# Patient Record
Sex: FEMALE | Race: BLACK OR AFRICAN AMERICAN | NOT HISPANIC OR LATINO | URBAN - METROPOLITAN AREA
[De-identification: names, ages, dates, MRNs, and addresses within clinical notes are randomized per-mention and may not be internally consistent; named-entity substitution may affect disease eponyms.]

---
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
## 2020-01-02 ENCOUNTER — APPOINTMENT (OUTPATIENT)
Dept: PEDIATRIC ADOLESCENT MEDICINE | Facility: CLINIC | Age: 20
End: 2020-01-02

## 2020-01-02 ENCOUNTER — OUTPATIENT (OUTPATIENT)
Dept: OUTPATIENT SERVICES | Facility: HOSPITAL | Age: 20
LOS: 1 days | End: 2020-01-02

## 2020-01-02 VITALS — SYSTOLIC BLOOD PRESSURE: 108 MMHG | DIASTOLIC BLOOD PRESSURE: 56 MMHG | HEART RATE: 79 BPM | WEIGHT: 153 LBS

## 2020-01-02 LAB — HCG UR QL: NEGATIVE

## 2020-01-02 RX ORDER — LEVONORGESTREL 1.5 MG/1
1.5 TABLET ORAL
Qty: 2 | Refills: 0 | Status: COMPLETED | OUTPATIENT
Start: 2019-12-16 | End: 2020-01-02

## 2020-01-02 NOTE — DISCUSSION/SUMMARY
[FreeTextEntry1] : 20 yo F here for pregnancy test given last visit took PlanB and was told to RTC for repeat hcg.  No recent unprotected sex.  Declines condoms today bc she reports having some at home.  Counseled on risks and benefits of various progesterone only methods. Patient reports she will RTC when she decides what is right for her.

## 2020-01-02 NOTE — PHYSICAL EXAM
[No Acute Distress] : no acute distress [Alert] : alert [Normocephalic] : normocephalic [Clear to Ausculatation Bilaterally] : clear to auscultation bilaterally [Regular Rate and Rhythm] : regular rate and rhythm [Soft] : soft

## 2020-01-02 NOTE — HISTORY OF PRESENT ILLNESS
[de-identified] : pregnancy test [FreeTextEntry6] : 18 y/o female presenting for pregnancy test. \par \par Last sex was 2 weeks ago. Did not use a condom.\par Using condoms sometimes. \par Not planning a pregnancy in the next 12 months. Has has convos here in clinic regarding BC methods but continues to remain undecided   - "thinking about different methods".\par \par Per patient trialed both OCP and Depo in the past.  OCP caused migraine HA requiring ER visit, and bled continuously on Depo. Patient reports having discussed mini-pill at previous visits but does not feel she would be adherent with the method.\par \par LMP 12/2019, does not know specific date.\par \par

## 2020-01-03 DIAGNOSIS — Z32.02 ENCOUNTER FOR PREGNANCY TEST, RESULT NEGATIVE: ICD-10-CM

## 2020-01-22 NOTE — ED PROVIDER NOTE - PHYSICAL EXAMINATION
GENERAL: A&Ox3, non-toxic appearing, no acute distress  HEENT: NCAT, neck supple, EOMI but painful, oral mucosa moist, normal conjunctiva, white sclera w/o injection, no discharge, eye soft to palpation w/ worsening sx w/ palpation of left eye, no erythema or swelling of orbit  RESP: CTAB, no respiratory distress, no wheezes/rhonchi/rales, speaking in full sentences  CV: RRR, no murmurs/rubs/gallops  ABDOMEN: soft, non-tender, non-distended, no guarding  MSK: no visible deformities  NEURO: no focal sensory or motor deficits, normal CN exam, PERRL, steady gait, RAMIREZ, LE w/ 20/30, LE w/o 20/40, RE w/ 20/100, RE w/o 20/200, BE w/ 20/30, BE w/o 20/40  SKIN: warm, normal color, well perfused, no rash  PSYCH: normal affect    -Hiren Rivera, PGY-1 Biopsy Method: Personna blade

## 2020-01-28 ENCOUNTER — OUTPATIENT (OUTPATIENT)
Dept: OUTPATIENT SERVICES | Facility: HOSPITAL | Age: 20
LOS: 1 days | End: 2020-01-28

## 2020-01-28 ENCOUNTER — APPOINTMENT (OUTPATIENT)
Dept: PEDIATRIC ADOLESCENT MEDICINE | Facility: CLINIC | Age: 20
End: 2020-01-28

## 2020-01-28 ENCOUNTER — APPOINTMENT (OUTPATIENT)
Dept: GASTROENTEROLOGY | Facility: CLINIC | Age: 20
End: 2020-01-28

## 2020-01-28 VITALS — SYSTOLIC BLOOD PRESSURE: 119 MMHG | DIASTOLIC BLOOD PRESSURE: 74 MMHG | HEART RATE: 94 BPM

## 2020-01-28 DIAGNOSIS — S69.91XA UNSPECIFIED INJURY OF RIGHT WRIST, HAND AND FINGER(S), INITIAL ENCOUNTER: ICD-10-CM

## 2020-01-28 DIAGNOSIS — Z73.3 STRESS, NOT ELSEWHERE CLASSIFIED: ICD-10-CM

## 2020-01-28 RX ORDER — IBUPROFEN 400 MG/1
400 TABLET, FILM COATED ORAL
Qty: 1 | Refills: 1 | Status: COMPLETED | COMMUNITY
Start: 2020-01-28 | End: 2020-01-30

## 2020-01-28 NOTE — RISK ASSESSMENT
[With Teen] : teen [de-identified] : Last sex: 1/6/20, used condom; no currently on contraception
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
## 2020-01-28 NOTE — REVIEW OF SYSTEMS
[Headache] : headache [Myalgia] : myalgia [Restriction of Motion] : restriction of motion [Negative] : Constitutional [Changes in Vision] : no changes in vision [Abdominal Pain] : no abdominal pain

## 2020-01-29 DIAGNOSIS — F43.23 ADJUSTMENT DISORDER WITH MIXED ANXIETY AND DEPRESSED MOOD: ICD-10-CM

## 2020-01-29 DIAGNOSIS — Z63.8 OTHER SPECIFIED PROBLEMS RELATED TO PRIMARY SUPPORT GROUP: ICD-10-CM

## 2020-01-29 DIAGNOSIS — Z63.0 PROBLEMS IN RELATIONSHIP WITH SPOUSE OR PARTNER: ICD-10-CM

## 2020-01-29 SDOH — SOCIAL STABILITY - SOCIAL INSECURITY: PROBLEMS IN RELATIONSHIP WITH SPOUSE OR PARTNER: Z63.0

## 2020-01-29 SDOH — SOCIAL STABILITY - SOCIAL INSECURITY: OTHER SPECIFIED PROBLEMS RELATED TO PRIMARY SUPPORT GROUP: Z63.8

## 2020-01-30 ENCOUNTER — APPOINTMENT (OUTPATIENT)
Dept: PEDIATRIC ADOLESCENT MEDICINE | Facility: CLINIC | Age: 20
End: 2020-01-30

## 2020-01-30 ENCOUNTER — OUTPATIENT (OUTPATIENT)
Dept: OUTPATIENT SERVICES | Facility: HOSPITAL | Age: 20
LOS: 1 days | End: 2020-01-30

## 2020-01-30 DIAGNOSIS — Z63.8 OTHER SPECIFIED PROBLEMS RELATED TO PRIMARY SUPPORT GROUP: ICD-10-CM

## 2020-01-30 DIAGNOSIS — F43.23 ADJUSTMENT DISORDER WITH MIXED ANXIETY AND DEPRESSED MOOD: ICD-10-CM

## 2020-01-30 DIAGNOSIS — Z63.0 PROBLEMS IN RELATIONSHIP WITH SPOUSE OR PARTNER: ICD-10-CM

## 2020-01-30 SDOH — SOCIAL STABILITY - SOCIAL INSECURITY: PROBLEMS IN RELATIONSHIP WITH SPOUSE OR PARTNER: Z63.0

## 2020-01-30 SDOH — SOCIAL STABILITY - SOCIAL INSECURITY: OTHER SPECIFIED PROBLEMS RELATED TO PRIMARY SUPPORT GROUP: Z63.8

## 2020-02-03 ENCOUNTER — APPOINTMENT (OUTPATIENT)
Dept: PEDIATRIC ADOLESCENT MEDICINE | Facility: CLINIC | Age: 20
End: 2020-02-03

## 2020-02-03 ENCOUNTER — OUTPATIENT (OUTPATIENT)
Dept: OUTPATIENT SERVICES | Facility: HOSPITAL | Age: 20
LOS: 1 days | End: 2020-02-03

## 2020-02-03 VITALS — HEART RATE: 85 BPM | DIASTOLIC BLOOD PRESSURE: 60 MMHG | SYSTOLIC BLOOD PRESSURE: 106 MMHG

## 2020-02-03 DIAGNOSIS — R55 SYNCOPE AND COLLAPSE: ICD-10-CM

## 2020-02-03 DIAGNOSIS — R51 HEADACHE: ICD-10-CM

## 2020-02-03 NOTE — REVIEW OF SYSTEMS
[Headache] : headache [Negative] : Constitutional [Changes in Vision] : no changes in vision [Abdominal Pain] : no abdominal pain
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
## 2020-02-03 NOTE — PHYSICAL EXAM
[Tired appearing] : tired appearing [EOMI] : EOMI [NL] : normotonic [Moves All Extremities x 4] : moves all extremities x4 [Normotonic] : normotonic [+2 Patella DTR] : +2 patella DTR [FreeTextEntry2] : + mild TTP of left side of occiput; no bruising; no inflammation [FreeTextEntry5] : YOSEF; Fundoscopic exam: right optic disc - pale  [de-identified] : no nuchal rigidity

## 2020-02-03 NOTE — RISK ASSESSMENT
[Grade: ____] : Grade: [unfilled] [With Teen] : teen [de-identified] : Lives with father & stepmother - conflict at home  [de-identified] : Last sex: 1/6/20, used condom; no currently on contraception

## 2020-02-03 NOTE — DISCUSSION/SUMMARY
[FreeTextEntry1] : 19 year old female presenting with headache x 3 days following syncopal episode. \par \par -Likely vasovagal syncope. Pt has a history of vasovagal syncope. Pt evaluated by cardiology in 2017 - normal echo and EKG. \par -Headache likely secondary to syncope. Reassuring exam with no red flags. \par -Dispensed Acetaminophen 325 mg 2 tabs po x 1.  \par -Counseled re: SMART headache management: sleep 8-9 hours per night, eat regular meals including breakfast, increase hydration, exercise regularly, reduce stress, and avoid triggers.\par -Recommended Acetaminophen or NSAIDs as needed for acute headaches greater than 5/10 in severity.  Do not use more than 2-3 times per week. \par -Reviewed red flags for a headache that warrants immediate evaluation in the emergency department. \par -Encouraged continued engagement in mental health counseling. Moved pt's therapy appt from the end of the week to earlier in the week for additional support. \par -Return to health center for follow-up 2/5/20 or sooner if headache worsens or if pt experiences any red flags.\par \par \par \par

## 2020-02-04 ENCOUNTER — OUTPATIENT (OUTPATIENT)
Dept: OUTPATIENT SERVICES | Facility: HOSPITAL | Age: 20
LOS: 1 days | End: 2020-02-04

## 2020-02-04 ENCOUNTER — APPOINTMENT (OUTPATIENT)
Dept: PEDIATRIC ADOLESCENT MEDICINE | Facility: CLINIC | Age: 20
End: 2020-02-04

## 2020-02-05 ENCOUNTER — APPOINTMENT (OUTPATIENT)
Dept: PEDIATRIC ADOLESCENT MEDICINE | Facility: CLINIC | Age: 20
End: 2020-02-05

## 2020-02-05 ENCOUNTER — OUTPATIENT (OUTPATIENT)
Dept: OUTPATIENT SERVICES | Facility: HOSPITAL | Age: 20
LOS: 1 days | End: 2020-02-05

## 2020-02-05 VITALS — DIASTOLIC BLOOD PRESSURE: 68 MMHG | SYSTOLIC BLOOD PRESSURE: 108 MMHG | HEART RATE: 77 BPM

## 2020-02-05 DIAGNOSIS — Z73.3 STRESS, NOT ELSEWHERE CLASSIFIED: ICD-10-CM

## 2020-02-05 DIAGNOSIS — R51 HEADACHE: ICD-10-CM
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
## 2020-02-05 NOTE — REVIEW OF SYSTEMS
[Negative] : Constitutional [Headache] : headache [Weakness] : weakness [Changes in Vision] : no changes in vision [Dizziness] : no dizziness [Lightheadness] : no lightheadness

## 2020-02-05 NOTE — RISK ASSESSMENT
[Grade: ____] : Grade: [unfilled] [With Teen] : teen [de-identified] : Last sex: 1/6/20, used condom; no currently on contraception  [de-identified] : Lives with father & stepmother - conflict at home

## 2020-02-05 NOTE — PHYSICAL EXAM
[Tired appearing] : tired appearing [EOMI] : EOMI [NL] : normotonic [Moves All Extremities x 4] : moves all extremities x4 [Normotonic] : normotonic [+2 Patella DTR] : +2 patella DTR [de-identified] : no nuchal rigidity  [FreeTextEntry5] : PERRLA; Fundoscopic exam: right optic disc - pale; + photosensitivity; + eyeglasses [de-identified] : Right hand: full ROM with flexion, extension, supination, pronation, and internal & external rotation; strength 5/5; mild TTP at base of 3rd metacarpal; + bruising, inflammation on base of 3rd metacarpal; capillary refill < 2 seconds; pulse 2+

## 2020-02-05 NOTE — DISCUSSION/SUMMARY
[FreeTextEntry1] : 19 year old female presenting 19 year old female presenting with headache x 5 days that began after syncopal episode and in setting of significant stressors. \par \par 1) Headache\par -Possible concussion. Syncopal episode, likely vasovagal, was not witnessed. Unclear if pt hit her head. \par -Possibly exacerbated by stressors at home. \par -SCAT symptom checklist done: total number of symptoms 5/22 and symptom severity score 19/132. \par -Reassuring neurological examination with exception of pale optic disc on right eye. Referred to ophthalmology for further evaluation. \par -Dispensed Ibuprofen 400 mg 1 tab po x1. \par -Counseled re: SMART headache management: sleep 8-9 hours per night, eat regular meals including breakfast, increase hydration, exercise regularly, reduce stress, and avoid triggers.\par -Recommended Acetaminophen or NSAIDs as needed for acute headaches greater than 5/10 in severity.  Do not use more than 2-3 times per week. \par -Reviewed red flags for a headache that warrants immediate evaluation in the emergency department. \par -Return to health [facility] for follow-up 2/720 or sooner if headache worsens or if pt experiences any red flags.\par \par 2) Stressors\par -PHQ 9 done: score of 4; has had thoughts of ending her life in past; no plan; no history of suicide attempt. \par -Encouraged continued engagement in mental health counseling at [facility] with Juani Irby LMSW. \par -Counseled on safety planning. Provided pt with information for Crisis Text Line. \par -Counseled on self-care. Pt enjoys listening to music for relaxation. \par -Pt is aware of services & support at [facility].\par \par \par

## 2020-02-06 ENCOUNTER — APPOINTMENT (OUTPATIENT)
Dept: PEDIATRIC ADOLESCENT MEDICINE | Facility: CLINIC | Age: 20
End: 2020-02-06

## 2020-02-06 DIAGNOSIS — R55 SYNCOPE AND COLLAPSE: ICD-10-CM

## 2020-02-06 DIAGNOSIS — R51 HEADACHE: ICD-10-CM

## 2020-02-12 DIAGNOSIS — Z73.3 STRESS, NOT ELSEWHERE CLASSIFIED: ICD-10-CM

## 2020-02-12 DIAGNOSIS — Z63.0 PROBLEMS IN RELATIONSHIP WITH SPOUSE OR PARTNER: ICD-10-CM

## 2020-02-12 DIAGNOSIS — F43.23 ADJUSTMENT DISORDER WITH MIXED ANXIETY AND DEPRESSED MOOD: ICD-10-CM

## 2020-02-12 DIAGNOSIS — Z62.820 PARENT-BIOLOGICAL CHILD CONFLICT: ICD-10-CM

## 2020-02-12 DIAGNOSIS — R51 HEADACHE: ICD-10-CM

## 2020-02-12 SDOH — SOCIAL STABILITY - SOCIAL INSECURITY: PROBLEMS IN RELATIONSHIP WITH SPOUSE OR PARTNER: Z63.0

## 2020-02-13 ENCOUNTER — APPOINTMENT (OUTPATIENT)
Dept: PEDIATRIC ADOLESCENT MEDICINE | Facility: CLINIC | Age: 20
End: 2020-02-13

## 2020-02-13 ENCOUNTER — OUTPATIENT (OUTPATIENT)
Dept: OUTPATIENT SERVICES | Facility: HOSPITAL | Age: 20
LOS: 1 days | End: 2020-02-13

## 2020-02-24 ENCOUNTER — APPOINTMENT (OUTPATIENT)
Dept: PEDIATRIC ADOLESCENT MEDICINE | Facility: CLINIC | Age: 20
End: 2020-02-24
Payer: COMMERCIAL

## 2020-02-24 ENCOUNTER — OUTPATIENT (OUTPATIENT)
Dept: OUTPATIENT SERVICES | Facility: HOSPITAL | Age: 20
LOS: 1 days | End: 2020-02-24

## 2020-02-24 VITALS — HEART RATE: 98 BPM | OXYGEN SATURATION: 98 % | SYSTOLIC BLOOD PRESSURE: 110 MMHG | DIASTOLIC BLOOD PRESSURE: 57 MMHG

## 2020-02-24 DIAGNOSIS — Z62.820 PARENT-BIOLOGICAL CHILD CONFLICT: ICD-10-CM

## 2020-02-24 DIAGNOSIS — F43.23 ADJUSTMENT DISORDER WITH MIXED ANXIETY AND DEPRESSED MOOD: ICD-10-CM

## 2020-02-24 DIAGNOSIS — Z63.0 PROBLEMS IN RELATIONSHIP WITH SPOUSE OR PARTNER: ICD-10-CM

## 2020-02-24 DIAGNOSIS — N94.4 PRIMARY DYSMENORRHEA: ICD-10-CM

## 2020-02-24 PROCEDURE — 99212 OFFICE O/P EST SF 10 MIN: CPT | Mod: NC

## 2020-02-24 RX ORDER — IBUPROFEN 600 MG/1
600 TABLET, FILM COATED ORAL
Qty: 30 | Refills: 0 | Status: COMPLETED | COMMUNITY
Start: 2020-01-28 | End: 2020-02-24

## 2020-02-24 RX ORDER — ACETAMINOPHEN 325 MG/1
325 TABLET ORAL
Qty: 2 | Refills: 0 | Status: COMPLETED | COMMUNITY
Start: 2020-02-03 | End: 2020-02-24

## 2020-02-24 SDOH — SOCIAL STABILITY - SOCIAL INSECURITY: PROBLEMS IN RELATIONSHIP WITH SPOUSE OR PARTNER: Z63.0

## 2020-02-24 NOTE — HISTORY OF PRESENT ILLNESS
[de-identified] : dysmenorrhea [FreeTextEntry6] : 18 y/o female with dysmenorrhea.  Period began on 2/20/20.  Has not taken any pain medications today yet.  Pain is currently an 8/10 in severity.  No nausea/vomiting.  +Mild headache.  No back pain.  No diarrhea.  \par \par Last SA was on 2/8/20.  Did not use a condom; using withdrawal method.  Did not take a Plan B.  Not currently on BC.  Using condoms never.   No new partner since last STI screening in October 2019.

## 2020-02-24 NOTE — DISCUSSION/SUMMARY
[FreeTextEntry1] : 20 y/o female with dysmenorrhea.  Has not taken any pain medications today yet.\par \par Plan\par - Ibuprofen 400 mg po x 1 given for pain.  \par - RTC PRN persistent or worsening symptoms.  Declined BC today.

## 2020-02-26 DIAGNOSIS — N94.4 PRIMARY DYSMENORRHEA: ICD-10-CM

## 2020-02-27 ENCOUNTER — OUTPATIENT (OUTPATIENT)
Dept: OUTPATIENT SERVICES | Facility: HOSPITAL | Age: 20
LOS: 1 days | End: 2020-02-27

## 2020-02-27 ENCOUNTER — APPOINTMENT (OUTPATIENT)
Dept: PEDIATRIC ADOLESCENT MEDICINE | Facility: CLINIC | Age: 20
End: 2020-02-27

## 2020-03-03 DIAGNOSIS — Z63.0 PROBLEMS IN RELATIONSHIP WITH SPOUSE OR PARTNER: ICD-10-CM

## 2020-03-03 DIAGNOSIS — Z62.820 PARENT-BIOLOGICAL CHILD CONFLICT: ICD-10-CM

## 2020-03-03 DIAGNOSIS — F43.23 ADJUSTMENT DISORDER WITH MIXED ANXIETY AND DEPRESSED MOOD: ICD-10-CM

## 2020-03-03 SDOH — SOCIAL STABILITY - SOCIAL INSECURITY: PROBLEMS IN RELATIONSHIP WITH SPOUSE OR PARTNER: Z63.0

## 2020-03-05 ENCOUNTER — APPOINTMENT (OUTPATIENT)
Dept: PEDIATRIC ADOLESCENT MEDICINE | Facility: CLINIC | Age: 20
End: 2020-03-05

## 2020-03-05 ENCOUNTER — OUTPATIENT (OUTPATIENT)
Dept: OUTPATIENT SERVICES | Facility: HOSPITAL | Age: 20
LOS: 1 days | End: 2020-03-05

## 2020-03-06 ENCOUNTER — APPOINTMENT (OUTPATIENT)
Dept: PEDIATRIC ADOLESCENT MEDICINE | Facility: CLINIC | Age: 20
End: 2020-03-06

## 2020-03-06 DIAGNOSIS — Z62.820 PARENT-BIOLOGICAL CHILD CONFLICT: ICD-10-CM

## 2020-03-06 DIAGNOSIS — F43.23 ADJUSTMENT DISORDER WITH MIXED ANXIETY AND DEPRESSED MOOD: ICD-10-CM

## 2020-03-06 DIAGNOSIS — Z63.0 PROBLEMS IN RELATIONSHIP WITH SPOUSE OR PARTNER: ICD-10-CM

## 2020-03-06 SDOH — SOCIAL STABILITY - SOCIAL INSECURITY: PROBLEMS IN RELATIONSHIP WITH SPOUSE OR PARTNER: Z63.0

## 2020-03-10 ENCOUNTER — APPOINTMENT (OUTPATIENT)
Dept: PEDIATRIC ADOLESCENT MEDICINE | Facility: CLINIC | Age: 20
End: 2020-03-10
Payer: SELF-PAY

## 2020-03-10 ENCOUNTER — OUTPATIENT (OUTPATIENT)
Dept: OUTPATIENT SERVICES | Facility: HOSPITAL | Age: 20
LOS: 1 days | End: 2020-03-10

## 2020-03-10 VITALS
SYSTOLIC BLOOD PRESSURE: 105 MMHG | HEART RATE: 81 BPM | TEMPERATURE: 98 F | DIASTOLIC BLOOD PRESSURE: 51 MMHG | OXYGEN SATURATION: 98 %

## 2020-03-10 DIAGNOSIS — Z78.9 OTHER SPECIFIED HEALTH STATUS: ICD-10-CM

## 2020-03-10 DIAGNOSIS — J06.9 ACUTE UPPER RESPIRATORY INFECTION, UNSPECIFIED: ICD-10-CM

## 2020-03-10 DIAGNOSIS — Z87.09 PERSONAL HISTORY OF OTHER DISEASES OF THE RESPIRATORY SYSTEM: ICD-10-CM

## 2020-03-10 PROCEDURE — 99213 OFFICE O/P EST LOW 20 MIN: CPT | Mod: NC

## 2020-03-10 RX ORDER — IBUPROFEN 400 MG/1
400 TABLET, FILM COATED ORAL
Qty: 1 | Refills: 0 | Status: COMPLETED | COMMUNITY
Start: 2020-02-24 | End: 2020-03-10

## 2020-03-10 RX ORDER — BENZOCAINE AND MENTHOL 15; 3.6 MG/1; MG/1
15-3.6 LOZENGE ORAL
Qty: 8 | Refills: 0 | Status: ACTIVE | COMMUNITY
Start: 2020-03-10

## 2020-03-10 RX ORDER — IBUPROFEN 400 MG/1
400 TABLET, FILM COATED ORAL
Qty: 1 | Refills: 0 | Status: COMPLETED | COMMUNITY
Start: 2020-03-10 | End: 2020-03-11

## 2020-03-10 NOTE — DISCUSSION/SUMMARY
[FreeTextEntry1] : 19 year yo F here for viral pharyngitis. Patient with no fevers, no concerning oropharynx findings make this likely a viral in origin (vs strep). \par \par - Dispensed ibuprofen and cepacol today\par - Counseled re: supportive care and pain management. \par - Encouraged rest.  Increase fluids\par - Counseled re: fever management.  \par - Viral Rx handout given.\par - Return to clinic as needed for new or worsening symptoms. \par \par

## 2020-03-10 NOTE — HISTORY OF PRESENT ILLNESS
[de-identified] : sore throat [FreeTextEntry6] : 19 year yo F here for sore/scratchy throat x 1 day. \par \par Patient reports 1 day of symptoms of sore throat, congestion, runny nose.\par no cough, no chest pain, no difficulty breathing.\par \par No fevers.\par Tolerating PO at baseline.\par No change in urination or bowel movements. Hx of constipation (BMs at baseline)\par No nausea or vomiting. \par No headache.\par No abdominal pain.\par \par Has not taken any medications today. \par Desires to stay in school.\par \par Sexually active: no oral sex. Last sex >7 days ago. Did not use a condom; using withdrawal method. Did not take a Plan B. Not currently on BC. Using condoms never. No new partner since last STI screening.\par

## 2020-03-10 NOTE — PHYSICAL EXAM
[No Acute Distress] : no acute distress [Alert] : alert [Normocephalic] : normocephalic [NL] : warm [de-identified] : MILD ERYTHEMA OF OROPHARYNX

## 2020-03-12 DIAGNOSIS — J06.9 ACUTE UPPER RESPIRATORY INFECTION, UNSPECIFIED: ICD-10-CM

## 2020-03-12 DIAGNOSIS — J02.9 ACUTE PHARYNGITIS, UNSPECIFIED: ICD-10-CM

## 2020-03-13 ENCOUNTER — APPOINTMENT (OUTPATIENT)
Dept: PEDIATRIC ADOLESCENT MEDICINE | Facility: CLINIC | Age: 20
End: 2020-03-13

## 2020-03-13 ENCOUNTER — OUTPATIENT (OUTPATIENT)
Dept: OUTPATIENT SERVICES | Facility: HOSPITAL | Age: 20
LOS: 1 days | End: 2020-03-13

## 2020-03-13 ENCOUNTER — RESULT CHARGE (OUTPATIENT)
Age: 20
End: 2020-03-13

## 2020-03-13 VITALS — DIASTOLIC BLOOD PRESSURE: 64 MMHG | SYSTOLIC BLOOD PRESSURE: 103 MMHG | WEIGHT: 150 LBS | HEART RATE: 91 BPM

## 2020-03-13 DIAGNOSIS — Z32.02 ENCOUNTER FOR PREGNANCY TEST, RESULT NEGATIVE: ICD-10-CM

## 2020-03-13 DIAGNOSIS — Z30.012 ENCOUNTER FOR PRESCRIPTION OF EMERGENCY CONTRACEPTION: ICD-10-CM

## 2020-03-13 LAB — HCG UR QL: NEGATIVE

## 2020-03-13 RX ORDER — LEVONORGESTREL 1.5 MG/1
1.5 TABLET ORAL
Qty: 2 | Refills: 0 | Status: ACTIVE | OUTPATIENT
Start: 2020-03-13

## 2020-03-13 NOTE — HISTORY OF PRESENT ILLNESS
[FreeTextEntry6] : 19 yr old female presents for Plan B\par Had unprotected sex 2 days ago\par LMP 2/20/20\par STI Screen 10/19\par No birth control due to severe headaches\par

## 2020-03-13 NOTE — DISCUSSION/SUMMARY
[FreeTextEntry1] : encounter for emergency contraception\par Plan B given\par UCG Negative\par condoms given\par offered birth control method declined

## 2020-03-19 ENCOUNTER — APPOINTMENT (OUTPATIENT)
Dept: PEDIATRIC ADOLESCENT MEDICINE | Facility: CLINIC | Age: 20
End: 2020-03-19

## 2020-03-30 DIAGNOSIS — Z32.02 ENCOUNTER FOR PREGNANCY TEST, RESULT NEGATIVE: ICD-10-CM

## 2020-03-30 DIAGNOSIS — Z30.012 ENCOUNTER FOR PRESCRIPTION OF EMERGENCY CONTRACEPTION: ICD-10-CM

## 2020-04-01 ENCOUNTER — OUTPATIENT (OUTPATIENT)
Dept: OUTPATIENT SERVICES | Facility: HOSPITAL | Age: 20
LOS: 1 days | End: 2020-04-01

## 2020-04-01 ENCOUNTER — APPOINTMENT (OUTPATIENT)
Dept: PEDIATRIC ADOLESCENT MEDICINE | Facility: CLINIC | Age: 20
End: 2020-04-01

## 2020-05-01 DIAGNOSIS — Z63.8 OTHER SPECIFIED PROBLEMS RELATED TO PRIMARY SUPPORT GROUP: ICD-10-CM

## 2020-05-01 DIAGNOSIS — F43.23 ADJUSTMENT DISORDER WITH MIXED ANXIETY AND DEPRESSED MOOD: ICD-10-CM

## 2020-05-01 SDOH — SOCIAL STABILITY - SOCIAL INSECURITY: OTHER SPECIFIED PROBLEMS RELATED TO PRIMARY SUPPORT GROUP: Z63.8

## 2020-12-23 PROBLEM — J06.9 UPPER RESPIRATORY INFECTION, ACUTE: Status: RESOLVED | Noted: 2018-12-14 | Resolved: 2020-12-23

## 2020-12-23 PROBLEM — Z87.09 HISTORY OF SORE THROAT: Status: RESOLVED | Noted: 2020-03-10 | Resolved: 2020-12-23

## 2022-09-17 ENCOUNTER — EMERGENCY (EMERGENCY)
Facility: HOSPITAL | Age: 22
LOS: 1 days | Discharge: HOME | End: 2022-09-17
Attending: STUDENT IN AN ORGANIZED HEALTH CARE EDUCATION/TRAINING PROGRAM

## 2022-09-17 VITALS
WEIGHT: 139.99 LBS | SYSTOLIC BLOOD PRESSURE: 102 MMHG | DIASTOLIC BLOOD PRESSURE: 53 MMHG | HEART RATE: 65 BPM | OXYGEN SATURATION: 100 % | TEMPERATURE: 99 F | HEIGHT: 67 IN | RESPIRATION RATE: 16 BRPM

## 2022-09-17 DIAGNOSIS — M25.572 PAIN IN LEFT ANKLE AND JOINTS OF LEFT FOOT: ICD-10-CM
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
## 2022-09-17 DIAGNOSIS — M79.672 PAIN IN LEFT FOOT: ICD-10-CM

## 2022-09-17 PROCEDURE — 73630 X-RAY EXAM OF FOOT: CPT | Mod: 26,LT

## 2022-09-17 PROCEDURE — 99283 EMERGENCY DEPT VISIT LOW MDM: CPT

## 2022-09-17 PROCEDURE — 73610 X-RAY EXAM OF ANKLE: CPT | Mod: 26,LT

## 2022-09-17 RX ORDER — ACETAMINOPHEN 500 MG
650 TABLET ORAL ONCE
Refills: 0 | Status: COMPLETED | OUTPATIENT
Start: 2022-09-17 | End: 2022-09-17

## 2022-09-17 RX ORDER — IBUPROFEN 200 MG
400 TABLET ORAL ONCE
Refills: 0 | Status: COMPLETED | OUTPATIENT
Start: 2022-09-17 | End: 2022-09-17

## 2022-09-17 RX ADMIN — Medication 650 MILLIGRAM(S): at 11:09

## 2022-09-17 RX ADMIN — Medication 400 MILLIGRAM(S): at 11:31

## 2022-09-17 NOTE — ED PROVIDER NOTE - PROGRESS NOTE DETAILS
No obvious fx on XR. Discussed results with the patient and recommended RICE therapy - rest, ice, compression and elevation. Placed air cast, given crutches, and recommended follow up with orthopedics, tylenol/motrin for pain control. I discussed the results and answered all questions. Patient is amenable to plan of being discharged home. Strict ED return precautions given

## 2022-09-17 NOTE — ED PROVIDER NOTE - NSFOLLOWUPINSTRUCTIONS_ED_ALL_ED_FT
- follow up with orthopedics in 1-2 weeks - will give information to clinic  - take tylenol and motrin together as needed for the pain

## 2022-09-17 NOTE — ED PROVIDER NOTE - NSFOLLOWUPCLINICS_GEN_ALL_ED_FT
[facility] [place]  Orthopedics  .  [place]   Phone: [phone]  Fax:

## 2022-09-17 NOTE — ED PROVIDER NOTE - OBJECTIVE STATEMENT
22 year old female presents for left foot and ankle pain s/p fall this morning. She reports walking down the stairs and tripping and then likely rolled her ankle. Did not hit her head or lose consciousness. She denies any chest, shoulder, back, head or knee pain.

## 2022-09-17 NOTE — ED ADULT NURSE NOTE - OBJECTIVE STATEMENT
PT PRESENTED TO ER, AOX3 AND AMBULATORY, WITH COMPLAINTS OF LEFT ANKLE PAIN. AS PER PT, SHE MISSED 1 STEP AND TWISTED ANKLE. PT HAD C/O PAIN WHEN BEARING WEIGHT ON EXTREMITY.

## 2022-09-17 NOTE — ED PROVIDER NOTE - PHYSICAL EXAMINATION
Const: Cooperative, in NAD  HEENT: Head is normocephalic, atraumatic. PERRLA. EOMI. Sclera and conjunctiva normal  Back: No midlines or paraspinous tenderness.  MSK: No pitting edema, erythema, or cyanosis.           Left ankle: wrapped in gauze. NV intact. Tenderness over lower lateral malleolus and calcaneus . +2 dp pulses bilaterally and can wiggle toes. Unable to bear weight  Neuro: Awake, AOx3, follows commands

## 2022-09-17 NOTE — ED PROVIDER NOTE - PATIENT PORTAL LINK FT
You can access the FollowMyHealth Patient Portal offered by [facility] by registering at the following website: [email or web address]. By joining [employer]’s FollowMyHealth portal, you will also be able to view your health information using other applications (apps) compatible with our system.

## 2022-09-17 NOTE — ED PROVIDER NOTE - CLINICAL SUMMARY MEDICAL DECISION MAKING FREE TEXT BOX
Patient presents for left ankle and foot pain s/p fall. VSS. She has tenderness to palpation to the left lateral malleolus, left lateral part of foot and unable to bear weight. Concern for fx, dislocation, ligamentous tear, contusion. Plan: meds, XR

## 2024-05-03 ENCOUNTER — EMERGENCY (EMERGENCY)
Facility: HOSPITAL | Age: 24
LOS: 0 days | Discharge: HOME | End: 2024-05-03
Attending: STUDENT IN AN ORGANIZED HEALTH CARE EDUCATION/TRAINING PROGRAM
Payer: COMMERCIAL

## 2024-05-03 VITALS
WEIGHT: 145.06 LBS | SYSTOLIC BLOOD PRESSURE: 105 MMHG | DIASTOLIC BLOOD PRESSURE: 71 MMHG | RESPIRATION RATE: 18 BRPM | OXYGEN SATURATION: 100 % | HEIGHT: 68 IN | TEMPERATURE: 98 F | HEART RATE: 87 BPM

## 2024-05-03 DIAGNOSIS — N93.9 ABNORMAL UTERINE AND VAGINAL BLEEDING, UNSPECIFIED: ICD-10-CM

## 2024-05-03 DIAGNOSIS — N92.1 EXCESSIVE AND FREQUENT MENSTRUATION WITH IRREGULAR CYCLE: ICD-10-CM

## 2024-05-03 DIAGNOSIS — Z86.2 PERSONAL HISTORY OF DISEASES OF THE BLOOD AND BLOOD-FORMING ORGANS AND CERTAIN DISORDERS INVOLVING THE IMMUNE MECHANISM: ICD-10-CM

## 2024-05-03 LAB
ALBUMIN SERPL ELPH-MCNC: 3.8 G/DL — SIGNIFICANT CHANGE UP (ref 3.3–5)
ALP SERPL-CCNC: 49 U/L — SIGNIFICANT CHANGE UP (ref 40–120)
ALT FLD-CCNC: 17 U/L — SIGNIFICANT CHANGE UP (ref 12–78)
ANION GAP SERPL CALC-SCNC: 5 MMOL/L — SIGNIFICANT CHANGE UP (ref 5–17)
APTT BLD: 27.1 SEC — SIGNIFICANT CHANGE UP (ref 24.5–35.6)
AST SERPL-CCNC: 20 U/L — SIGNIFICANT CHANGE UP (ref 15–37)
BASOPHILS # BLD AUTO: 0.04 K/UL — SIGNIFICANT CHANGE UP (ref 0–0.2)
BASOPHILS NFR BLD AUTO: 0.6 % — SIGNIFICANT CHANGE UP (ref 0–2)
BILIRUB SERPL-MCNC: 0.2 MG/DL — SIGNIFICANT CHANGE UP (ref 0.2–1.2)
BLD GP AB SCN SERPL QL: SIGNIFICANT CHANGE UP
BUN SERPL-MCNC: 9 MG/DL — SIGNIFICANT CHANGE UP (ref 7–23)
CALCIUM SERPL-MCNC: 9.4 MG/DL — SIGNIFICANT CHANGE UP (ref 8.5–10.1)
CHLORIDE SERPL-SCNC: 106 MMOL/L — SIGNIFICANT CHANGE UP (ref 96–108)
CO2 SERPL-SCNC: 25 MMOL/L — SIGNIFICANT CHANGE UP (ref 22–31)
CREAT SERPL-MCNC: 0.64 MG/DL — SIGNIFICANT CHANGE UP (ref 0.5–1.3)
EGFR: 127 ML/MIN/1.73M2 — SIGNIFICANT CHANGE UP
EOSINOPHIL # BLD AUTO: 0.39 K/UL — SIGNIFICANT CHANGE UP (ref 0–0.5)
EOSINOPHIL NFR BLD AUTO: 5.7 % — SIGNIFICANT CHANGE UP (ref 0–6)
GLUCOSE SERPL-MCNC: 90 MG/DL — SIGNIFICANT CHANGE UP (ref 70–99)
HCG SERPL-ACNC: <1 MIU/ML — SIGNIFICANT CHANGE UP
HCT VFR BLD CALC: 29.4 % — LOW (ref 34.5–45)
HGB BLD-MCNC: 9 G/DL — LOW (ref 11.5–15.5)
IMM GRANULOCYTES NFR BLD AUTO: 0.3 % — SIGNIFICANT CHANGE UP (ref 0–0.9)
INR BLD: 1 RATIO — SIGNIFICANT CHANGE UP (ref 0.85–1.18)
LYMPHOCYTES # BLD AUTO: 1.87 K/UL — SIGNIFICANT CHANGE UP (ref 1–3.3)
LYMPHOCYTES # BLD AUTO: 27.4 % — SIGNIFICANT CHANGE UP (ref 13–44)
MCHC RBC-ENTMCNC: 25.4 PG — LOW (ref 27–34)
MCHC RBC-ENTMCNC: 30.6 G/DL — LOW (ref 32–36)
MCV RBC AUTO: 83.1 FL — SIGNIFICANT CHANGE UP (ref 80–100)
MONOCYTES # BLD AUTO: 0.67 K/UL — SIGNIFICANT CHANGE UP (ref 0–0.9)
MONOCYTES NFR BLD AUTO: 9.8 % — SIGNIFICANT CHANGE UP (ref 2–14)
NEUTROPHILS # BLD AUTO: 3.84 K/UL — SIGNIFICANT CHANGE UP (ref 1.8–7.4)
NEUTROPHILS NFR BLD AUTO: 56.2 % — SIGNIFICANT CHANGE UP (ref 43–77)
NRBC # BLD: 0 /100 WBCS — SIGNIFICANT CHANGE UP (ref 0–0)
PLATELET # BLD AUTO: 407 K/UL — HIGH (ref 150–400)
POTASSIUM SERPL-MCNC: 4.2 MMOL/L — SIGNIFICANT CHANGE UP (ref 3.5–5.3)
POTASSIUM SERPL-SCNC: 4.2 MMOL/L — SIGNIFICANT CHANGE UP (ref 3.5–5.3)
PROT SERPL-MCNC: 8.6 GM/DL — HIGH (ref 6–8.3)
PROTHROM AB SERPL-ACNC: 12 SEC — SIGNIFICANT CHANGE UP (ref 9.5–13)
RBC # BLD: 3.54 M/UL — LOW (ref 3.8–5.2)
RBC # FLD: 16.3 % — HIGH (ref 10.3–14.5)
SODIUM SERPL-SCNC: 136 MMOL/L — SIGNIFICANT CHANGE UP (ref 135–145)
WBC # BLD: 6.83 K/UL — SIGNIFICANT CHANGE UP (ref 3.8–10.5)
WBC # FLD AUTO: 6.83 K/UL — SIGNIFICANT CHANGE UP (ref 3.8–10.5)

## 2024-05-03 PROCEDURE — 99284 EMERGENCY DEPT VISIT MOD MDM: CPT

## 2024-05-03 RX ORDER — SODIUM CHLORIDE 9 MG/ML
1000 INJECTION INTRAMUSCULAR; INTRAVENOUS; SUBCUTANEOUS ONCE
Refills: 0 | Status: COMPLETED | OUTPATIENT
Start: 2024-05-03 | End: 2024-05-03

## 2024-05-03 RX ADMIN — SODIUM CHLORIDE 1000 MILLILITER(S): 9 INJECTION INTRAMUSCULAR; INTRAVENOUS; SUBCUTANEOUS at 08:44

## 2024-05-03 NOTE — ED ADULT NURSE NOTE - OBJECTIVE STATEMENT
Patient alert and verbally responsive, came in due to vaginal bleeding with big clots started yesterday, last menstruation finished a week after. No other complaints. h/o anemia.

## 2024-05-03 NOTE — ED PROVIDER NOTE - NSFOLLOWUPCLINICS_GEN_ALL_ED_FT
[facility]  OB/GYN & Surg  [address] [place] [address]  Phone: [phone]  Fax:

## 2024-05-03 NOTE — ED ADULT NURSE NOTE - NSFALLRISKASMT_ED_ALL_ED_DT
Addended by: DEWAYNE JOHNSON on: 7/19/2019 03:59 PM     Modules accepted: Orders     03-May-2024 09:02

## 2024-05-03 NOTE — ED PROVIDER NOTE - PATIENT PORTAL LINK FT
You can access the FollowMyHealth Patient Portal offered by [facility] by registering at the following website: [email or web address]. By joining [employer]’s FollowMyHealth portal, you will also be able to view your health information using other applications (apps) compatible with our system.

## 2024-05-03 NOTE — ED PROVIDER NOTE - PROGRESS NOTE DETAILS
Joseph DO: only used one pad since being in ED, stable for dc, d/w pt return precautions and outpt follow up

## 2024-05-03 NOTE — ED PROVIDER NOTE - PHYSICAL EXAMINATION
Gen: aox3, nad,   Head: NCAT  ENT: Airway patent, moist mucous membranes, nasal passageways clear  Cardiac: Normal rate, normal rhythm  Respiratory: Lungs CTA B/L  Gastrointestinal: Abdomen soft, nontender, nondistended, no rebound, no guarding  MSK: No gross abnormalities, FROM of all four extremities, no edema  HEME: Extremities warm and well perfused   Skin: No rashes, no lesions  Neuro: No gross neurologic deficits, normal speech, no gait abnormality

## 2024-05-03 NOTE — ED PROVIDER NOTE - CARE PLAN
Principal Discharge DX:	History of heavy vaginal bleeding   1 Principal Discharge DX:	Menometrorrhagia

## 2024-05-03 NOTE — ED PROVIDER NOTE - OBJECTIVE STATEMENT
23F  pmhx iron deficiency anemia who presents for evaluation of irregular menstruation - pt reports heavy vaginal bleeding onset yday 8am, yday with clots, used about 7 pads yday, today also noted continued heavy bleeding so presented to ED. Denies sob/ light headedness. Denies abd pain. No AC or hx of bleeding d/o. Not on OCPs. Last menstrual cycle was  completed about 1 week ago

## 2024-05-03 NOTE — ED PROVIDER NOTE - NSFOLLOWUPINSTRUCTIONS_ED_ALL_ED_FT
You were seen today for heavy vaginal bleeding. Your blood count was 9 today and you did not require blood transfusion    We recommend close follow up with a gynecologist to determine if you need medication or further imaging.     There were no emergent lab abnormalities today    Stay hydrated    Return for any dizziness, near fainting, shortness of breath or if you are going through more than 1 pad per hour for 3-4 hours which signifies significant bleeding

## 2024-05-03 NOTE — ED ADULT TRIAGE NOTE - CHIEF COMPLAINT QUOTE
complaining of vaginal bleeding with big clots started yesterday, last menstruation finished a week after. h/o anemia

## 2024-05-03 NOTE — ED PROVIDER NOTE - CLINICAL SUMMARY MEDICAL DECISION MAKING FREE TEXT BOX
23F  pmhx iron deficiency anemia who presents for evaluation of irregular heavy menstruation -  - no symptomatic anemia, vitals wnl, rule out pregnancy, check lab to assess for anemia, transfuse if hgb < 7, IV fluids for supportive care  - pt with hgb 9, reports baseline anemia but unknown baseline hgb, no excess bleeding in ED, pt reassessed, recommended outpt GYN follow up for further management, return precautions discussed - discussed signs of symptomatic anemia or indication of significant bleeding that would warrant return to ER

## 2024-07-26 NOTE — HISTORY OF PRESENT ILLNESS
[FreeTextEntry6] : 18 year old female for OCP surveillance. Happy with method. Denies ACHES. No unwanted side effects. Missed 2 pills in last month - double up each time. 
0 = independent
